# Patient Record
Sex: FEMALE | Race: WHITE | NOT HISPANIC OR LATINO | Employment: OTHER | URBAN - METROPOLITAN AREA
[De-identification: names, ages, dates, MRNs, and addresses within clinical notes are randomized per-mention and may not be internally consistent; named-entity substitution may affect disease eponyms.]

---

## 2017-08-10 NOTE — PATIENT DISCUSSION
(H33.302) Unspecified retinal break, left eye - Assesment : H/O TEAR INFERIOR- SURROUNDED BY LASER. NO NEW HOLES, TEARS OR DETACHMENTS SEEN TODAY - Plan : Follow up with Dr. Teresa Romero as scheduled in August. Call with increased flashes, floaters or decreased vision.

## 2017-08-10 NOTE — PATIENT DISCUSSION
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 RECOMMEND MRX TO TRY AND HELP DECREASE ""STAR BURST"" AT NIGHT WHILE DRIVING.   PATIENT HAS SYMFONY - TORIC - Plan : REFRACTION WITH CORINNE OR NATALIE ** CAREFUL ATTENTION NO TO ""OVER MINUS"" WITH SYMFONY  ***RECOMMEND CRIZAL ON RX **"

## 2018-09-07 NOTE — PATIENT DISCUSSION
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 STARBURST HAVE IMPROVED SOME PER PATIENT  RECOMMEND MRX TO TRY AND HELP DECREASE ""STAR BURST"" AT NIGHT WHILE DRIVING.   PATIENT HAS SYMFONY - TORIC  PATIENT PREVIOUSLY TRIED YELLOW TINTED GLASSES WITH NO IMPROVEMENT - Plan : Recommend glasses with anti-reflective coating to minimize starburst"

## 2018-09-07 NOTE — PATIENT DISCUSSION
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 - Plan : Monitor for Changes. Advised patient to call our office with decreased vision or increased symptoms.

## 2018-09-07 NOTE — PATIENT DISCUSSION
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 Advised patient to call our office with decreased vision or an increase in flashes and/or floaters.  RV 1 year EXAM

## 2019-11-04 NOTE — PROCEDURE NOTE: CLINICAL
PROCEDURE NOTE: Incision and Drainage of Abscess; Simple or Single Right Lower Lid. Diagnosis: Meibomian Gland Dysfunction. Prior to treatment, the risks/benefits/alternatives were discussed. The patient wished to proceed with procedure. Patient tolerated procedure well. There were no complications. Post procedure instructions given. Small abscess RLL margin. verbal consent obtained. Mir Wylie

## 2019-11-04 NOTE — PATIENT DISCUSSION
Early posterior capsular opacification was noted. It was not causing any decreased vision or glare at this time. No treatment was recommended at this time OD.

## 2021-10-13 ENCOUNTER — NEW PATIENT COMPREHENSIVE (OUTPATIENT)
Dept: URBAN - METROPOLITAN AREA CLINIC 35 | Facility: CLINIC | Age: 66
End: 2021-10-13

## 2021-10-13 DIAGNOSIS — H52.7: ICD-10-CM

## 2021-10-13 PROCEDURE — 92015 DETERMINE REFRACTIVE STATE: CPT

## 2021-10-13 PROCEDURE — 92004 COMPRE OPH EXAM NEW PT 1/>: CPT

## 2021-10-13 ASSESSMENT — VISUAL ACUITY
OD_SC: 20/25-2
OS_CC: J1
OS_SC: 20/25
OD_CC: J1

## 2021-10-13 ASSESSMENT — TONOMETRY
OD_IOP_MMHG: 15
OS_IOP_MMHG: 15

## 2022-10-19 ENCOUNTER — COMPREHENSIVE EXAM (OUTPATIENT)
Dept: URBAN - METROPOLITAN AREA CLINIC 35 | Facility: CLINIC | Age: 67
End: 2022-10-19

## 2022-10-19 DIAGNOSIS — H52.7: ICD-10-CM

## 2022-10-19 DIAGNOSIS — H43.393: ICD-10-CM

## 2022-10-19 DIAGNOSIS — H25.093: ICD-10-CM

## 2022-10-19 DIAGNOSIS — Z98.890: ICD-10-CM

## 2022-10-19 PROCEDURE — 92014 COMPRE OPH EXAM EST PT 1/>: CPT

## 2022-10-19 PROCEDURE — 92015 DETERMINE REFRACTIVE STATE: CPT

## 2022-10-19 ASSESSMENT — TONOMETRY
OS_IOP_MMHG: 16
OD_IOP_MMHG: 16

## 2022-10-19 ASSESSMENT — VISUAL ACUITY
OD_CC: J1
OS_CC: J2-
OS_SC: 20/25-1
OD_SC: 20/25-2
OU_SC: 20/25-1
OU_CC: J1

## 2022-10-19 ASSESSMENT — KERATOMETRY
OD_K2POWER_DIOPTERS: 45.25
OS_K1POWER_DIOPTERS: 42.00
OD_AXISANGLE2_DEGREES: 90
OS_K2POWER_DIOPTERS: 42.50
OS_AXISANGLE_DEGREES: 145
OS_AXISANGLE2_DEGREES: 55
OD_AXISANGLE_DEGREES: 180
OD_K1POWER_DIOPTERS: 43.25

## 2023-10-24 ENCOUNTER — COMPREHENSIVE EXAM (OUTPATIENT)
Dept: URBAN - METROPOLITAN AREA CLINIC 35 | Facility: CLINIC | Age: 68
End: 2023-10-24

## 2023-10-24 DIAGNOSIS — Z98.890: ICD-10-CM

## 2023-10-24 DIAGNOSIS — H25.093: ICD-10-CM

## 2023-10-24 DIAGNOSIS — H25.813: ICD-10-CM

## 2023-10-24 DIAGNOSIS — H52.7: ICD-10-CM

## 2023-10-24 DIAGNOSIS — H43.393: ICD-10-CM

## 2023-10-24 PROCEDURE — 92015 DETERMINE REFRACTIVE STATE: CPT

## 2023-10-24 PROCEDURE — 92014 COMPRE OPH EXAM EST PT 1/>: CPT

## 2023-10-24 ASSESSMENT — VISUAL ACUITY
OS_CC: J2 OTC
OU_SC: 20/25-1
OD_CC: J2 OTC
OS_SC: 20/30-2
OU_CC: J2 OTC
OD_SC: 20/30+2

## 2023-10-24 ASSESSMENT — TONOMETRY
OS_IOP_MMHG: 16
OD_IOP_MMHG: 17

## 2024-11-05 ENCOUNTER — COMPREHENSIVE EXAM (OUTPATIENT)
Dept: URBAN - METROPOLITAN AREA CLINIC 35 | Facility: CLINIC | Age: 69
End: 2024-11-05

## 2024-11-05 DIAGNOSIS — Z98.890: ICD-10-CM

## 2024-11-05 DIAGNOSIS — H40.033: ICD-10-CM

## 2024-11-05 DIAGNOSIS — H43.393: ICD-10-CM

## 2024-11-05 DIAGNOSIS — H52.7: ICD-10-CM

## 2024-11-05 DIAGNOSIS — H25.813: ICD-10-CM

## 2024-11-05 DIAGNOSIS — H04.123: ICD-10-CM

## 2024-11-05 PROCEDURE — 92015 DETERMINE REFRACTIVE STATE: CPT

## 2024-11-05 PROCEDURE — 92014 COMPRE OPH EXAM EST PT 1/>: CPT

## 2025-01-15 ENCOUNTER — SURGERY/PROCEDURE (OUTPATIENT)
Age: 70
End: 2025-01-15

## 2025-01-15 ENCOUNTER — CONSULTATION/EVALUATION (OUTPATIENT)
Age: 70
End: 2025-01-15

## 2025-01-15 DIAGNOSIS — H43.393: ICD-10-CM

## 2025-01-15 DIAGNOSIS — H04.123: ICD-10-CM

## 2025-01-15 DIAGNOSIS — H40.033: ICD-10-CM

## 2025-01-15 DIAGNOSIS — H25.813: ICD-10-CM

## 2025-01-15 DIAGNOSIS — Z98.890: ICD-10-CM

## 2025-01-15 PROCEDURE — 99213 OFFICE O/P EST LOW 20 MIN: CPT | Mod: 57

## 2025-01-15 PROCEDURE — 6676150 BILATERAL LASER IRIDOTOMY: Mod: 50

## 2025-01-15 RX ORDER — PREDNISOLONE ACETATE 10 MG/ML: 1 SUSPENSION/ DROPS OPHTHALMIC
